# Patient Record
Sex: MALE | Race: WHITE | NOT HISPANIC OR LATINO | URBAN - METROPOLITAN AREA
[De-identification: names, ages, dates, MRNs, and addresses within clinical notes are randomized per-mention and may not be internally consistent; named-entity substitution may affect disease eponyms.]

---

## 2021-11-11 ENCOUNTER — WEB ENCOUNTER (OUTPATIENT)
Dept: URBAN - METROPOLITAN AREA CLINIC 98 | Facility: CLINIC | Age: 86
End: 2021-11-11

## 2021-11-11 ENCOUNTER — OFFICE VISIT (OUTPATIENT)
Dept: URBAN - METROPOLITAN AREA CLINIC 98 | Facility: CLINIC | Age: 86
End: 2021-11-11
Payer: MEDICARE

## 2021-11-11 VITALS
HEART RATE: 75 BPM | HEIGHT: 65 IN | SYSTOLIC BLOOD PRESSURE: 135 MMHG | TEMPERATURE: 96.9 F | DIASTOLIC BLOOD PRESSURE: 80 MMHG | WEIGHT: 169.6 LBS | BODY MASS INDEX: 28.26 KG/M2

## 2021-11-11 DIAGNOSIS — K21.9 GASTROESOPHAGEAL REFLUX DISEASE, UNSPECIFIED WHETHER ESOPHAGITIS PRESENT: ICD-10-CM

## 2021-11-11 PROCEDURE — 99203 OFFICE O/P NEW LOW 30 MIN: CPT | Performed by: INTERNAL MEDICINE

## 2021-11-11 RX ORDER — SIMVASTATIN 20 MG/1
TABLET, FILM COATED ORAL
Qty: 0 | Refills: 0 | Status: ACTIVE | COMMUNITY
Start: 1900-01-01

## 2021-11-11 RX ORDER — ASPIRIN 81 MG/1
TABLET, CHEWABLE ORAL
Qty: 0 | Refills: 0 | Status: ACTIVE | COMMUNITY
Start: 1900-01-01

## 2021-11-11 RX ORDER — OLMESARTAN MEDOXOMIL 20 MG/1
TABLET, FILM COATED ORAL
Qty: 0 | Refills: 0 | Status: ACTIVE | COMMUNITY
Start: 1900-01-01

## 2021-11-11 RX ORDER — OMEPRAZOLE 20 MG/1
CAPSULE, DELAYED RELEASE ORAL
Qty: 0 | Refills: 0 | Status: ACTIVE | COMMUNITY
Start: 1900-01-01

## 2021-11-11 RX ORDER — TAMSULOSIN HYDROCHLORIDE 0.4 MG/1
CAPSULE ORAL
Qty: 0 | Refills: 0 | Status: ACTIVE | COMMUNITY
Start: 1900-01-01

## 2021-11-11 NOTE — HPI-TODAY'S VISIT:
History of GERD with possible Ayala's.  Note no Ayala's found at an EGD April 2015. PPI stopped then. Takes pepcid as needed. Colonoscopy was done November 2013.  Diverticulosis hemorrhoids otherwise normal no polyps found. Heartburn Mucus in throat. No dysphagia. HBP controlled. Coronary stent. 20 years ago. Cardiologist Dr Germain. Heart has been stable.

## 2022-06-21 ENCOUNTER — OFFICE VISIT (OUTPATIENT)
Dept: URBAN - METROPOLITAN AREA CLINIC 50 | Facility: CLINIC | Age: 87
End: 2022-06-21
Payer: MEDICARE

## 2022-06-21 VITALS
DIASTOLIC BLOOD PRESSURE: 83 MMHG | BODY MASS INDEX: 25.33 KG/M2 | WEIGHT: 152 LBS | HEIGHT: 65 IN | TEMPERATURE: 97.4 F | HEART RATE: 71 BPM | SYSTOLIC BLOOD PRESSURE: 130 MMHG

## 2022-06-21 DIAGNOSIS — K21.9 GASTROESOPHAGEAL REFLUX DISEASE, UNSPECIFIED WHETHER ESOPHAGITIS PRESENT: ICD-10-CM

## 2022-06-21 PROBLEM — 235595009: Status: ACTIVE | Noted: 2021-11-11

## 2022-06-21 PROCEDURE — 99204 OFFICE O/P NEW MOD 45 MIN: CPT | Performed by: INTERNAL MEDICINE

## 2022-06-21 RX ORDER — OLMESARTAN MEDOXOMIL 20 MG/1
TABLET, FILM COATED ORAL
Qty: 0 | Refills: 0 | Status: ACTIVE | COMMUNITY
Start: 1900-01-01

## 2022-06-21 RX ORDER — SIMVASTATIN 20 MG/1
TABLET, FILM COATED ORAL
Qty: 0 | Refills: 0 | Status: ACTIVE | COMMUNITY
Start: 1900-01-01

## 2022-06-21 RX ORDER — ASPIRIN 81 MG/1
TABLET, CHEWABLE ORAL
Qty: 0 | Refills: 0 | Status: ACTIVE | COMMUNITY
Start: 1900-01-01

## 2022-06-21 RX ORDER — OMEPRAZOLE 20 MG/1
CAPSULE, DELAYED RELEASE ORAL
Qty: 0 | Refills: 0 | Status: ACTIVE | COMMUNITY
Start: 1900-01-01

## 2022-06-21 RX ORDER — TAMSULOSIN HYDROCHLORIDE 0.4 MG/1
CAPSULE ORAL
Qty: 0 | Refills: 0 | Status: ACTIVE | COMMUNITY
Start: 1900-01-01

## 2022-06-21 NOTE — HPI-TODAY'S VISIT:
a month ago, had an episode of vomiting after dinner. vomited once.  no issues since then. no dysphagia no odynophagia years ago, was diagnsoed w/ barretts was 25 years ago. no c/o today wife reports occ belching in AM last egds reviewed, w bx, no active barretts noted on bx. no GERD used to eat in the middle of the night for years. now stopped. tried to lose weight and has lost 15 lbs intentionally allergies/phlegm/PND things are better no longer having any issues.

## 2023-10-10 ENCOUNTER — OFFICE VISIT (OUTPATIENT)
Dept: URBAN - METROPOLITAN AREA CLINIC 96 | Facility: CLINIC | Age: 88
End: 2023-10-10
Payer: MEDICARE

## 2023-10-10 ENCOUNTER — DASHBOARD ENCOUNTERS (OUTPATIENT)
Age: 88
End: 2023-10-10

## 2023-10-10 VITALS
HEART RATE: 74 BPM | HEIGHT: 65 IN | SYSTOLIC BLOOD PRESSURE: 106 MMHG | BODY MASS INDEX: 24.99 KG/M2 | WEIGHT: 150 LBS | DIASTOLIC BLOOD PRESSURE: 62 MMHG | TEMPERATURE: 97.2 F

## 2023-10-10 DIAGNOSIS — K22.70 BARRETT'S ESOPHAGUS WITHOUT DYSPLASIA: ICD-10-CM

## 2023-10-10 DIAGNOSIS — R05.9 COUGH, UNSPECIFIED TYPE: ICD-10-CM

## 2023-10-10 DIAGNOSIS — R06.6 HICCUPS: ICD-10-CM

## 2023-10-10 DIAGNOSIS — K21.9 GASTROESOPHAGEAL REFLUX DISEASE, UNSPECIFIED WHETHER ESOPHAGITIS PRESENT: ICD-10-CM

## 2023-10-10 PROBLEM — 302914006: Status: ACTIVE | Noted: 2023-10-10

## 2023-10-10 PROCEDURE — 99214 OFFICE O/P EST MOD 30 MIN: CPT | Performed by: INTERNAL MEDICINE

## 2023-10-10 RX ORDER — OMEPRAZOLE 20 MG/1
CAPSULE, DELAYED RELEASE ORAL
Qty: 0 | Refills: 0 | Status: DISCONTINUED | COMMUNITY
Start: 1900-01-01

## 2023-10-10 RX ORDER — OLMESARTAN MEDOXOMIL 20 MG/1
TABLET, FILM COATED ORAL
Qty: 0 | Refills: 0 | Status: ACTIVE | COMMUNITY
Start: 1900-01-01

## 2023-10-10 RX ORDER — PANTOPRAZOLE SODIUM 40 MG/1
1 TABLET TABLET, DELAYED RELEASE ORAL ONCE A DAY
Qty: 90 TABLET | Refills: 0 | OUTPATIENT
Start: 2023-10-10

## 2023-10-10 RX ORDER — TAMSULOSIN HYDROCHLORIDE 0.4 MG/1
CAPSULE ORAL
Qty: 0 | Refills: 0 | Status: ACTIVE | COMMUNITY
Start: 1900-01-01

## 2023-10-10 RX ORDER — ASPIRIN 81 MG/1
TABLET, CHEWABLE ORAL
Qty: 0 | Refills: 0 | Status: ACTIVE | COMMUNITY
Start: 1900-01-01

## 2023-10-10 RX ORDER — SIMVASTATIN 20 MG/1
TABLET, FILM COATED ORAL
Qty: 0 | Refills: 0 | Status: ACTIVE | COMMUNITY
Start: 1900-01-01

## 2023-10-10 RX ORDER — ESOMEPRAZOLE MAGNESIUM 20 MG/1
1 CAPSULE CAPSULE, DELAYED RELEASE ORAL ONCE A DAY
Status: ACTIVE | COMMUNITY

## 2023-10-10 NOTE — PHYSICAL EXAM EYES:
Chronic long-standing changes per report. Resolving hemorrhage.  No significant acute abnormalities. Can be discussed further at next visit,    Conjuntivae and eyelids appear normal, Sclerae : White without injection

## 2023-10-10 NOTE — HPI-TODAY'S VISIT:
88-year-old male recently seen by Dr. Cabello 6/21/2022 for dysphagia. Reportedly has prior remote history of Ayala's diagnosed over 25 years ago.  Patient was advised to trial PPI, see ENT physician for PND/allergies.  Reflux precautions provided.   EGD performed by Dr. Frias 4/22/2015 for dyspepsia and heartburn demonstrated LA grade A esophagitis.  Nodule in the gastric antrum.  Gastric erythema.  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  Gastric biopsies negative for H. pylori.  Distal esophageal biopsies no diagnostic abnormality.  He now presents for hiccups. Onset last Wednesday. Was told to increase esomeprazole. No CP or SOB. No dysphagia. No prior such issue. Wife thinks he irritated his diaphragm due to terrible cough/phlegm. No f/c. Non productive cough. No unintentional weight loss. CXR was normal 3 weeks ago per patient. No melena. No rectal bleeding.

## 2023-10-20 ENCOUNTER — TELEPHONE ENCOUNTER (OUTPATIENT)
Dept: URBAN - METROPOLITAN AREA CLINIC 96 | Facility: CLINIC | Age: 88
End: 2023-10-20

## 2024-01-04 ENCOUNTER — ERX REFILL RESPONSE (OUTPATIENT)
Dept: URBAN - METROPOLITAN AREA CLINIC 96 | Facility: CLINIC | Age: 89
End: 2024-01-04

## 2024-01-04 RX ORDER — PANTOPRAZOLE SODIUM 40 MG/1
1 TABLET TABLET, DELAYED RELEASE ORAL ONCE A DAY
Qty: 90 TABLET | Refills: 0 | OUTPATIENT

## 2025-03-17 ENCOUNTER — LAB OUTSIDE AN ENCOUNTER (OUTPATIENT)
Dept: URBAN - METROPOLITAN AREA CLINIC 98 | Facility: CLINIC | Age: OVER 89
End: 2025-03-17

## 2025-03-17 ENCOUNTER — OFFICE VISIT (OUTPATIENT)
Dept: URBAN - METROPOLITAN AREA CLINIC 98 | Facility: CLINIC | Age: OVER 89
End: 2025-03-17
Payer: COMMERCIAL

## 2025-03-17 VITALS
SYSTOLIC BLOOD PRESSURE: 122 MMHG | TEMPERATURE: 97.2 F | BODY MASS INDEX: 24.99 KG/M2 | HEIGHT: 65 IN | HEART RATE: 84 BPM | WEIGHT: 150 LBS | DIASTOLIC BLOOD PRESSURE: 61 MMHG

## 2025-03-17 DIAGNOSIS — R06.6 HICCUPS: ICD-10-CM

## 2025-03-17 DIAGNOSIS — K21.9 GASTROESOPHAGEAL REFLUX DISEASE, UNSPECIFIED WHETHER ESOPHAGITIS PRESENT: ICD-10-CM

## 2025-03-17 PROCEDURE — 99214 OFFICE O/P EST MOD 30 MIN: CPT | Performed by: INTERNAL MEDICINE

## 2025-03-17 RX ORDER — SIMVASTATIN 20 MG/1
TABLET, FILM COATED ORAL
Qty: 0 | Refills: 0 | Status: ACTIVE | COMMUNITY
Start: 1900-01-01

## 2025-03-17 RX ORDER — ASPIRIN 81 MG/1
TABLET, CHEWABLE ORAL
Qty: 0 | Refills: 0 | Status: ACTIVE | COMMUNITY
Start: 1900-01-01

## 2025-03-17 RX ORDER — OLMESARTAN MEDOXOMIL 20 MG/1
TABLET, FILM COATED ORAL
Qty: 0 | Refills: 0 | Status: ACTIVE | COMMUNITY
Start: 1900-01-01

## 2025-03-17 RX ORDER — PANTOPRAZOLE SODIUM 40 MG/1
1 TABLET TABLET, DELAYED RELEASE ORAL ONCE A DAY
Qty: 30 TABLET | Refills: 3 | OUTPATIENT

## 2025-03-17 RX ORDER — TAMSULOSIN HYDROCHLORIDE 0.4 MG/1
CAPSULE ORAL
Qty: 0 | Refills: 0 | Status: ACTIVE | COMMUNITY
Start: 1900-01-01

## 2025-03-17 NOTE — HPI-TODAY'S VISIT:
Mr. Ovalles is a 89 yo M presenting for followup visit for GERD, last visit with Dr. Bonilla on 10/10/23.   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  Has been on omeprazole 20 mg once daily for a few months.  Now is having a lot of coughing and sneezing due to pollen and also has been having hiccups again.Hiccups are frequent throughout the day over the last 1 week. Had them a few years and improved with pantoprazole.  They believe pantoprazole helped the symptoms last time.  No trouble swallowing. Not having heartburn currently.  I reviewed:  4/22/15 EGD: antral nodule 4/22/15 EGD path: chronic inflammation at EGJ, no IM or dysplasia 11/19/13 EGD: medium hiatal hernia 11/19/13 EGD  path: EGJ- no IM or dysplasia

## 2025-03-17 NOTE — EXAM-FUNCTIONAL ASSESSMENT
Constitutional: well-developed, normal communication ability.   Eyes: Conjunctivae and eyelids appear normal, no scleral icterus. Respiratory: symmetric expansion of chest wall, normal work of breathing   Gastrointestinal:  normoactive bowel sounds, soft, no tenderness, no rebound tenderness, no shifting dullness, no organomegaly.   Musculoskeletal:walks with cane   Skin: no jaundice   Neurologic: Oriented to person, place,   Psychiatric: Normal mood and appropriate affect.

## 2025-03-18 ENCOUNTER — TELEPHONE ENCOUNTER (OUTPATIENT)
Dept: URBAN - METROPOLITAN AREA CLINIC 98 | Facility: CLINIC | Age: OVER 89
End: 2025-03-18

## 2025-03-18 LAB
ALBUMIN: 4.4
ALKALINE PHOSPHATASE: 148
ALT (SGPT): 9
AST (SGOT): 17
BASO (ABSOLUTE): 0
BASOS: 1
BILIRUBIN, TOTAL: 0.3
BUN/CREATININE RATIO: 19
BUN: 34
CALCIUM: 9.1
CARBON DIOXIDE, TOTAL: 20
CHLORIDE: 108
CREATININE: 1.82
EGFR: 35
EOS (ABSOLUTE): 0.2
EOS: 2
GLOBULIN, TOTAL: 2.5
GLUCOSE: 128
HEMATOCRIT: 37.4
HEMATOLOGY COMMENTS:: (no result)
HEMOGLOBIN: 12.4
IMMATURE CELLS: (no result)
IMMATURE GRANS (ABS): 0
IMMATURE GRANULOCYTES: 0
LYMPHS (ABSOLUTE): 1.1
LYMPHS: 14
MCH: 31.5
MCHC: 33.2
MCV: 95
MONOCYTES(ABSOLUTE): 0.7
MONOCYTES: 9
NEUTROPHILS (ABSOLUTE): 5.9
NEUTROPHILS: 74
NRBC: (no result)
PLATELETS: 165
POTASSIUM: 4.8
PROTEIN, TOTAL: 6.9
RBC: 3.94
RDW: 12.4
SODIUM: 142
TSH: 2.3
WBC: 7.8

## 2025-03-20 LAB — GGT: 14

## 2025-03-25 ENCOUNTER — OFFICE VISIT (OUTPATIENT)
Dept: URBAN - METROPOLITAN AREA CLINIC 50 | Facility: CLINIC | Age: OVER 89
End: 2025-03-25
Payer: COMMERCIAL

## 2025-03-25 DIAGNOSIS — G30.9 ALZHEIMER'S DISEASE, UNSPECIFIED: ICD-10-CM

## 2025-03-25 DIAGNOSIS — K21.9 GASTROESOPHAGEAL REFLUX DISEASE, UNSPECIFIED WHETHER ESOPHAGITIS PRESENT: ICD-10-CM

## 2025-03-25 DIAGNOSIS — K11.7 EXCESSIVE SALIVATION: ICD-10-CM

## 2025-03-25 DIAGNOSIS — R06.6 HICCUPS: ICD-10-CM

## 2025-03-25 DIAGNOSIS — R54 ADVANCED AGE: ICD-10-CM

## 2025-03-25 PROBLEM — 191519005: Status: ACTIVE | Noted: 2025-03-25

## 2025-03-25 PROBLEM — 26929004: Status: ACTIVE | Noted: 2025-03-25

## 2025-03-25 PROBLEM — 49808004: Status: ACTIVE | Noted: 2025-03-25

## 2025-03-25 PROCEDURE — 99214 OFFICE O/P EST MOD 30 MIN: CPT | Performed by: PHYSICIAN ASSISTANT

## 2025-03-25 RX ORDER — OLMESARTAN MEDOXOMIL 20 MG/1
TABLET, FILM COATED ORAL
Qty: 0 | Refills: 0 | Status: ACTIVE | COMMUNITY

## 2025-03-25 RX ORDER — PANTOPRAZOLE SODIUM 40 MG/1
1 TABLET TABLET, DELAYED RELEASE ORAL ONCE A DAY
Qty: 30 TABLET | Refills: 3 | Status: ACTIVE | COMMUNITY

## 2025-03-25 RX ORDER — ASPIRIN 81 MG/1
TABLET, CHEWABLE ORAL
Qty: 0 | Refills: 0 | Status: ACTIVE | COMMUNITY

## 2025-03-25 RX ORDER — SIMVASTATIN 20 MG/1
TABLET, FILM COATED ORAL
Qty: 0 | Refills: 0 | Status: ACTIVE | COMMUNITY
Start: 1900-01-01

## 2025-03-25 RX ORDER — TAMSULOSIN HYDROCHLORIDE 0.4 MG/1
CAPSULE ORAL
Qty: 0 | Refills: 0 | Status: ACTIVE | COMMUNITY
Start: 1900-01-01

## 2025-03-25 NOTE — HPI-TODAY'S VISIT:
3/25/25: 89 y/o M here f/u hiccuping and GERD Seen by multiple proviers - Chad Cabello, Bettie, Jermain - wants to be seen at [facility] going forward Last seen by Dr. Alexandre 1 wk ago, seen w spouse today who provides hx Spoke to neurologist, they tried adding gabapentin last week Still having issues burping/coughing/hiccupping uncontrollably Coughing up a bunch of phelgm seems like biggest problem Spouse wonders if neurologic issue Denies any correlation w eating- happens before or after, no during, no dyphagia No heartburn/indigestion Switched from omerpazole to pantoprazole last week - not sure if changed much yet, maybe hiccuping less Silently hiccuping/spasming  Some hx allergies, using allergy OTC meds, not necessarily worsening lately -- 3/17/25 (Dr. Alexandre):  Mr. Ovalles is a 89 yo M presenting for followup visit for GERD, last visit with Dr. Bonilla on 10/10/23.   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  Has been on omeprazole 20 mg once daily for a few months.  Now is having a lot of coughing and sneezing due to pollen and also has been having hiccups again.Hiccups are frequent throughout the day over the last 1 week. Had them a few years and improved with pantoprazole.  They believe pantoprazole helped the symptoms last time.  No trouble swallowing. Not having heartburn currently.  I reviewed:  4/22/15 EGD: antral nodule 4/22/15 EGD path: chronic inflammation at EGJ, no IM or dysplasia 11/19/13 EGD: medium hiatal hernia 11/19/13 EGD  path: EGJ- no IM or dysplasia

## 2025-08-01 ENCOUNTER — APPOINTMENT (OUTPATIENT)
Age: OVER 89
Setting detail: DERMATOLOGY
End: 2025-08-01

## 2025-08-01 DIAGNOSIS — L73.9 FOLLICULAR DISORDER, UNSPECIFIED: ICD-10-CM

## 2025-08-01 DIAGNOSIS — L81.0 POSTINFLAMMATORY HYPERPIGMENTATION: ICD-10-CM

## 2025-08-01 PROCEDURE — ? PRESCRIPTION MEDICATION MANAGEMENT

## 2025-08-01 PROCEDURE — ? PRESCRIPTION

## 2025-08-01 PROCEDURE — ? MEDICATION COUNSELING

## 2025-08-01 PROCEDURE — ?

## 2025-08-01 PROCEDURE — ? COUNSELING

## 2025-08-01 PROCEDURE — ? ADDITIONAL NOTES

## 2025-08-01 RX ORDER — MUPIROCIN 20 MG/G
OINTMENT TOPICAL
Qty: 22 | Refills: 1 | Status: ERX | COMMUNITY
Start: 2025-08-01

## 2025-08-01 RX ADMIN — MUPIROCIN: 20 OINTMENT TOPICAL at 00:00

## 2025-08-01 ASSESSMENT — LOCATION SIMPLE DESCRIPTION DERM
LOCATION SIMPLE: LEFT CHEEK
LOCATION SIMPLE: RIGHT CHEEK

## 2025-08-01 ASSESSMENT — LOCATION DETAILED DESCRIPTION DERM
LOCATION DETAILED: RIGHT CENTRAL MALAR CHEEK
LOCATION DETAILED: LEFT CENTRAL MALAR CHEEK

## 2025-08-01 ASSESSMENT — LOCATION ZONE DERM: LOCATION ZONE: FACE
